# Patient Record
Sex: MALE
[De-identification: names, ages, dates, MRNs, and addresses within clinical notes are randomized per-mention and may not be internally consistent; named-entity substitution may affect disease eponyms.]

---

## 2020-05-06 ENCOUNTER — HOSPITAL ENCOUNTER (EMERGENCY)
Dept: HOSPITAL 92 - ERS | Age: 40
Discharge: HOME | End: 2020-05-06
Payer: COMMERCIAL
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
## 2020-05-06 DIAGNOSIS — S93.401A: Primary | ICD-10-CM

## 2020-05-06 DIAGNOSIS — F17.210: ICD-10-CM

## 2020-05-06 NOTE — RAD
RIGHT ANKLE THREE VIEW:

5/6/20

 

HISTORY: 

Injury.

 

COMPARISON:

None.

 

FINDINGS: 

There is an ossicle on the deltoid ligament on the medial ankle mortise from old injury. There is bim
alleolar soft tissue swelling, greater along the lateral ankle. No acute displaced fracture or malali
gnment. 

 

IMPRESSION: 

1.      Old deltoid ligament injury.

2.      Lateral ankle sprain.

3.      No acute fracture.

 

POS: HOME